# Patient Record
Sex: MALE | Race: WHITE | NOT HISPANIC OR LATINO | Employment: UNEMPLOYED | URBAN - METROPOLITAN AREA
[De-identification: names, ages, dates, MRNs, and addresses within clinical notes are randomized per-mention and may not be internally consistent; named-entity substitution may affect disease eponyms.]

---

## 2023-09-29 ENCOUNTER — TRANSCRIBE ORDERS (OUTPATIENT)
Dept: NEPHROLOGY | Facility: CLINIC | Age: 63
End: 2023-09-29
Payer: MEDICARE

## 2023-09-29 DIAGNOSIS — R94.4 NONSPECIFIC ABNORMAL RESULTS OF KIDNEY FUNCTION STUDY: ICD-10-CM

## 2023-12-13 ENCOUNTER — APPOINTMENT (OUTPATIENT)
Dept: NEPHROLOGY | Facility: CLINIC | Age: 63
End: 2023-12-13
Payer: MEDICARE

## 2024-02-26 ENCOUNTER — APPOINTMENT (OUTPATIENT)
Dept: RADIOLOGY | Facility: HOSPITAL | Age: 64
End: 2024-02-26
Payer: MEDICARE

## 2024-02-29 ENCOUNTER — APPOINTMENT (OUTPATIENT)
Dept: VASCULAR SURGERY | Facility: CLINIC | Age: 64
End: 2024-02-29
Payer: MEDICARE

## 2025-07-01 ENCOUNTER — DOCUMENTATION (OUTPATIENT)
Facility: HOSPITAL | Age: 65
End: 2025-07-01
Payer: MEDICARE

## 2025-07-01 VITALS — BODY MASS INDEX: 22.4 KG/M2 | WEIGHT: 160 LBS | HEIGHT: 71 IN

## 2025-07-01 DIAGNOSIS — Z01.818 PRE-TRANSPLANT EVALUATION FOR KIDNEY TRANSPLANT: Primary | ICD-10-CM

## 2025-07-01 NOTE — PROGRESS NOTES
Pre-Kidney Intake Questionnaire    1. Do you live in an assisted living/nursing facility? No  2. Do you have difficulty reading or writing in English? No  3. Do you have transportation to and from you medical appointments? Yes.  What type of transportation do you use? Other: [employer]  4. Do you have a family member or friend who can accompany you to the appointment? Yes  5. Who is your primary care doctor?  [facility]  6. What is the cause of your kidney disease? High blood pressure,advil  7. Who is your kidney doctor?    8. Are you currently on dialysis? Yes. What type?  PD.  What days?  mtthfs.  How many years?  14 months  9. Do you currently have any open sores or wounds? No  10. Have you ever had an amputation? No  11. Have you ever been diagnosed with cancer? No  12. Do you have a history of heart attack or stroke? Yes.  When?  Quadruple bypass 2014,  13. Are you currently wearing oxygen? No  14. Have you been hospitalized for any reason in the last year?  Yes.  Where?  [facility]   15. Do you have a potential living donor?  No    Comments

## 2025-08-25 ENCOUNTER — OFFICE VISIT (OUTPATIENT)
Facility: HOSPITAL | Age: 65
End: 2025-08-25
Payer: MEDICARE

## 2025-08-25 VITALS
SYSTOLIC BLOOD PRESSURE: 135 MMHG | WEIGHT: 165.8 LBS | DIASTOLIC BLOOD PRESSURE: 79 MMHG | HEART RATE: 61 BPM | BODY MASS INDEX: 23.12 KG/M2 | OXYGEN SATURATION: 97 % | TEMPERATURE: 97.5 F

## 2025-08-25 DIAGNOSIS — Z01.818 PRE-TRANSPLANT EVALUATION FOR KIDNEY TRANSPLANT: ICD-10-CM

## 2025-08-25 DIAGNOSIS — Z99.2 ESRD ON DIALYSIS (MULTI): Primary | ICD-10-CM

## 2025-08-25 DIAGNOSIS — N18.6 ESRD ON DIALYSIS (MULTI): Primary | ICD-10-CM

## 2025-08-25 DIAGNOSIS — J43.9 PULMONARY EMPHYSEMA, UNSPECIFIED EMPHYSEMA TYPE (MULTI): ICD-10-CM

## 2025-08-25 PROBLEM — R52 PAIN: Status: ACTIVE | Noted: 2025-08-25

## 2025-08-25 PROBLEM — I87.1 RENAL VEIN STENOSIS: Status: ACTIVE | Noted: 2024-01-12

## 2025-08-25 PROBLEM — I82.90 VENOUS THROMBOSIS: Status: ACTIVE | Noted: 2025-08-25

## 2025-08-25 PROBLEM — N18.4 STAGE 4 CHRONIC KIDNEY DISEASE (MULTI): Status: ACTIVE | Noted: 2024-04-08

## 2025-08-25 PROBLEM — I70.1 RENAL ARTERY STENOSIS: Status: ACTIVE | Noted: 2025-01-14

## 2025-08-25 PROBLEM — D35.00 ADRENAL ADENOMA: Status: ACTIVE | Noted: 2025-08-25

## 2025-08-25 PROBLEM — F32.89 OTHER SPECIFIED DEPRESSIVE DISORDER: Status: ACTIVE | Noted: 2023-08-28

## 2025-08-25 PROBLEM — Z98.890 PONV (POSTOPERATIVE NAUSEA AND VOMITING): Status: ACTIVE | Noted: 2025-01-14

## 2025-08-25 PROBLEM — I65.21 STENOSIS OF RIGHT CAROTID ARTERY: Status: ACTIVE | Noted: 2024-10-11

## 2025-08-25 PROBLEM — E44.1 MALNUTRITION OF MILD DEGREE (MULTI): Status: ACTIVE | Noted: 2024-04-26

## 2025-08-25 PROBLEM — F10.11 ALCOHOL USE DISORDER, MILD, IN EARLY REMISSION: Status: ACTIVE | Noted: 2023-08-28

## 2025-08-25 PROBLEM — F41.9 ANXIETY: Status: ACTIVE | Noted: 2024-04-28

## 2025-08-25 PROBLEM — I25.5 ISCHEMIC CARDIOMYOPATHY: Status: ACTIVE | Noted: 2025-08-25

## 2025-08-25 PROBLEM — I21.4 NSTEMI (NON-ST ELEVATED MYOCARDIAL INFARCTION) (MULTI): Status: ACTIVE | Noted: 2024-05-15

## 2025-08-25 PROBLEM — G47.00 INSOMNIA: Status: ACTIVE | Noted: 2025-08-25

## 2025-08-25 PROBLEM — D63.1 ANEMIA IN CHRONIC KIDNEY DISEASE: Status: ACTIVE | Noted: 2025-08-25

## 2025-08-25 PROBLEM — R11.2 PONV (POSTOPERATIVE NAUSEA AND VOMITING): Status: ACTIVE | Noted: 2025-01-14

## 2025-08-25 PROBLEM — I70.213 ATHEROSCLEROSIS OF NATIVE ARTERY OF BOTH LOWER EXTREMITIES WITH INTERMITTENT CLAUDICATION: Status: ACTIVE | Noted: 2025-08-25

## 2025-08-25 PROBLEM — N18.9 ANEMIA IN CHRONIC KIDNEY DISEASE: Status: ACTIVE | Noted: 2025-08-25

## 2025-08-25 PROBLEM — J44.9 CHRONIC OBSTRUCTIVE PULMONARY DISEASE (MULTI): Status: ACTIVE | Noted: 2025-08-25

## 2025-08-25 PROCEDURE — 99214 OFFICE O/P EST MOD 30 MIN: CPT

## 2025-08-25 PROCEDURE — 3078F DIAST BP <80 MM HG: CPT | Performed by: TRANSPLANT SURGERY

## 2025-08-25 PROCEDURE — 3075F SYST BP GE 130 - 139MM HG: CPT | Performed by: TRANSPLANT SURGERY

## 2025-08-25 PROCEDURE — 99204 OFFICE O/P NEW MOD 45 MIN: CPT | Performed by: TRANSPLANT SURGERY

## 2025-08-25 PROCEDURE — 1126F AMNT PAIN NOTED NONE PRSNT: CPT | Performed by: TRANSPLANT SURGERY

## 2025-08-25 ASSESSMENT — ENCOUNTER SYMPTOMS
WEAKNESS: 0
FEVER: 0
FATIGUE: 1
ABDOMINAL DISTENTION: 0
SHORTNESS OF BREATH: 0
CONFUSION: 0
TREMORS: 0
CHOKING: 0

## 2025-08-25 ASSESSMENT — PAIN SCALES - GENERAL: PAINLEVEL_OUTOF10: 0-NO PAIN
